# Patient Record
Sex: MALE | Race: WHITE | URBAN - METROPOLITAN AREA
[De-identification: names, ages, dates, MRNs, and addresses within clinical notes are randomized per-mention and may not be internally consistent; named-entity substitution may affect disease eponyms.]

---

## 2017-01-18 ENCOUNTER — OFFICE VISIT (OUTPATIENT)
Dept: DERMATOLOGY | Age: 72
End: 2017-01-18

## 2017-01-18 DIAGNOSIS — L73.8 SEBACEOUS HYPERPLASIA: ICD-10-CM

## 2017-01-18 DIAGNOSIS — T38.0X5A STEROID ACNE: ICD-10-CM

## 2017-01-18 DIAGNOSIS — L82.1 SEBORRHEIC KERATOSES: ICD-10-CM

## 2017-01-18 DIAGNOSIS — D48.5 NEOPLASM OF UNCERTAIN BEHAVIOR OF SKIN: Primary | ICD-10-CM

## 2017-01-18 DIAGNOSIS — L70.8 STEROID ACNE: ICD-10-CM

## 2017-01-18 PROCEDURE — 99213 OFFICE O/P EST LOW 20 MIN: CPT | Performed by: DERMATOLOGY

## 2017-01-18 PROCEDURE — 11100 PR BIOPSY OF SKIN LESION: CPT | Performed by: DERMATOLOGY

## 2017-01-24 ENCOUNTER — TELEPHONE (OUTPATIENT)
Dept: DERMATOLOGY | Age: 72
End: 2017-01-24

## 2018-01-31 ENCOUNTER — OFFICE VISIT (OUTPATIENT)
Dept: DERMATOLOGY | Age: 73
End: 2018-01-31

## 2018-01-31 DIAGNOSIS — L57.0 KERATOSIS, ACTINIC: Primary | ICD-10-CM

## 2018-01-31 DIAGNOSIS — L72.0 EPIDERMAL CYST: ICD-10-CM

## 2018-01-31 DIAGNOSIS — L73.8 SEBACEOUS HYPERPLASIA: ICD-10-CM

## 2018-01-31 DIAGNOSIS — L82.1 SEBORRHEIC KERATOSES: ICD-10-CM

## 2018-01-31 PROCEDURE — 99214 OFFICE O/P EST MOD 30 MIN: CPT | Performed by: DERMATOLOGY

## 2018-01-31 PROCEDURE — 17003 DESTRUCT PREMALG LES 2-14: CPT | Performed by: DERMATOLOGY

## 2018-01-31 PROCEDURE — 17000 DESTRUCT PREMALG LESION: CPT | Performed by: DERMATOLOGY

## 2018-01-31 NOTE — PROGRESS NOTES
[facility]) Dermatology  Derek Sinha M.D.  [phone]       Marcia May  1945    74 y.o. male     Date of Visit: 1/31/2018    Chief Complaint:   Chief Complaint   Patient presents with    Skin Exam     TBSE        I was asked to see this patient by Dr. Sheldon ref. provider found. History of Present Illness:  1. Total body skin exam    New scaly red papule left temple and right temple. Dry. Not itching, bleeding, growing. Sebaceous hyperplasia relatively stable-no discrete lesions that are bothersome. Epidermal cyst on his back-was previously removed and recurred. Has never been infected or drained. Sometimes pruritic. Increasing number of seborrheic keratoses over his torso. Mostly asymptomatic. Present for years. Skin History:  No history of skin cancer. No history of colon cancer. Has had a colonoscopy done previously but not recently- does not remember exact date. 3/17 pacemaker     History of angioedema treated by Dr. Margo Maria of Systems:  Constitutional: Reports general sense of well-being       Past Medical History, Surgical History, Family History, Medications and Allergies reviewed. Social History:   Social History     Social History    Marital status:      Spouse name: N/A    Number of children: N/A    Years of education: N/A     Occupational History    Not on file. Social History Main Topics    Smoking status: Never Smoker    Smokeless tobacco: Never Used    Alcohol use Yes      Comment: Rare, not even weekly    Drug use: No    Sexual activity: Yes     Partners: Female     Other Topics Concern    Not on file     Social History Narrative    No narrative on file       Physical Examination       -General: Well-appearing, NAD  Normal affect. Total body skin exam including scalp, face, neck, chest, abdomen, back, bilateral upper extremities, bilateral lower extremities, ocular conjunctiva, external lips, and nails was performed.   Josiah Moise

## 2021-06-10 ENCOUNTER — OFFICE VISIT (OUTPATIENT)
Dept: DERMATOLOGY | Age: 76
End: 2021-06-10
Payer: MEDICARE

## 2021-06-10 VITALS — TEMPERATURE: 97.6 F

## 2021-06-10 DIAGNOSIS — D48.5 NEOPLASM OF UNCERTAIN BEHAVIOR OF SKIN: Primary | ICD-10-CM

## 2021-06-10 PROCEDURE — 11102 TANGNTL BX SKIN SINGLE LES: CPT | Performed by: DERMATOLOGY

## 2021-06-10 NOTE — PROGRESS NOTES
[facility]) Dermatology  Meredith Kawasaki, M.D.  [phone]       Steven Major  1945    66 y.o. male     Date of Visit: 6/10/2021    Chief Complaint:   Chief Complaint   Patient presents with    Skin Lesion     spot on right hand, had about seven months        I was asked to see this patient by Dr. Sheldon ref. provider found. History of Present Illness:  1. Patient presents today for new annular erythematous plaque on his right dorsal hand between his second and third MCP. Present for 7 months. Tried over-the-counter cortisone cream with no improvement. No other similar lesions. Not itching, bleeding, growing      Skin History:  No history of skin cancer. No history of colon cancer. Has had a colonoscopy done previously but not recently- does not remember exact date.     3/17 pacemaker     History of angioedema treated by Dr. Venu Michaels    Review of Systems:  Constitutional: Reports general sense of well-being       Past Medical History, Surgical History, Family History, Medications and Allergies reviewed. Social History:   Social History     Socioeconomic History    Marital status:      Spouse name: Not on file    Number of children: Not on file    Years of education: Not on file    Highest education level: Not on file   Occupational History    Not on file   Tobacco Use    Smoking status: Never Smoker    Smokeless tobacco: Never Used   Substance and Sexual Activity    Alcohol use: Yes     Comment: Rare, not even weekly    Drug use: No    Sexual activity: Yes     Partners: Female   Other Topics Concern    Not on file   Social History Narrative    Not on file     Social Determinants of Health     Financial Resource Strain:     Difficulty of Paying Living Expenses:    Food Insecurity:     Worried About Running Out of Food in the Last Year:     [address] in the Last Year:    Transportation Needs:     Lack of Transportation (Medical):      Lack of Transportation (Non-Medical): Physical Activity:     Days of Exercise per Week:     Minutes of Exercise per Session:    Stress:     Feeling of Stress :    Social Connections:     Frequency of Communication with Friends and Family:     Frequency of Social Gatherings with Friends and Family:     Attends [employer] Services:     Active Member of Clubs or Organizations:     Attends Club or Organization Meetings:     Marital Status:    Intimate Partner Violence:     Fear of Current or Ex-Partner:     Emotionally Abused:     Physically Abused:     Sexually Abused:        Physical Examination       -General: Well-appearing, NAD  1.\1.5 cm annular erythematous plaque right dorsal hand between second and third MCP. Central clearing and suggestion of beaded border              Assessment and Plan     1. Neoplasm of uncertain behavior of skin-suspicious for granuloma annulare-recommended biopsy--Discussed possible diagnosis. Patient agreeable to biopsy. Verbal consent obtained after risks (infection, bleeding, scar), benefits and alternatives explained. -Area(s) to be biopsied were marked with a surgical pen. Site(s) were cleansed with alcohol. Local anesthesia achieved with 1% lidocaine with epinephrine/sodium bicarbonate. Shave biopsy performed with a razor blade. Hemostasis was achieved with aluminum chloride. The wound(s) were dressed with petrolatum and covered with a bandage. Specimen(s) sent to pathology. Pt educated re: risk of bleeding, infection, scar and wound care instructions.

## 2021-06-15 LAB — DERMATOLOGY PATHOLOGY REPORT: NORMAL
